# Patient Record
Sex: FEMALE | Race: BLACK OR AFRICAN AMERICAN | Employment: PART TIME | URBAN - METROPOLITAN AREA
[De-identification: names, ages, dates, MRNs, and addresses within clinical notes are randomized per-mention and may not be internally consistent; named-entity substitution may affect disease eponyms.]

---

## 2018-07-17 ENCOUNTER — HOSPITAL ENCOUNTER (OUTPATIENT)
Dept: LAB | Age: 58
Discharge: HOME | End: 2018-07-17
Payer: MEDICARE

## 2018-07-17 ENCOUNTER — OFFICE VISIT (OUTPATIENT)
Dept: FAMILY MEDICINE CLINIC | Age: 58
End: 2018-07-17

## 2018-07-17 VITALS
OXYGEN SATURATION: 98 % | HEART RATE: 77 BPM | TEMPERATURE: 96.9 F | HEIGHT: 65 IN | DIASTOLIC BLOOD PRESSURE: 81 MMHG | RESPIRATION RATE: 18 BRPM | BODY MASS INDEX: 36.32 KG/M2 | WEIGHT: 218 LBS | SYSTOLIC BLOOD PRESSURE: 139 MMHG

## 2018-07-17 DIAGNOSIS — Z13.220 LIPID SCREENING: ICD-10-CM

## 2018-07-17 DIAGNOSIS — Z76.89 ENCOUNTER TO ESTABLISH CARE: ICD-10-CM

## 2018-07-17 DIAGNOSIS — Z86.69 HX OF MIGRAINES: ICD-10-CM

## 2018-07-17 DIAGNOSIS — F34.1 DYSTHYMIA: ICD-10-CM

## 2018-07-17 DIAGNOSIS — I10 ESSENTIAL HYPERTENSION: ICD-10-CM

## 2018-07-17 DIAGNOSIS — E89.0 POSTOPERATIVE HYPOTHYROIDISM: Primary | ICD-10-CM

## 2018-07-17 DIAGNOSIS — Z13.1 SCREENING FOR DIABETES MELLITUS: ICD-10-CM

## 2018-07-17 DIAGNOSIS — K21.00 GASTROESOPHAGEAL REFLUX DISEASE WITH ESOPHAGITIS: ICD-10-CM

## 2018-07-17 DIAGNOSIS — E89.0 POSTOPERATIVE HYPOTHYROIDISM: ICD-10-CM

## 2018-07-17 LAB
ALBUMIN SERPL-MCNC: 3.8 G/DL (ref 3.4–5)
ALBUMIN/GLOB SERPL: 1.1 {RATIO} (ref 0.8–1.7)
ALP SERPL-CCNC: 109 U/L (ref 45–117)
ALT SERPL-CCNC: 28 U/L (ref 13–56)
ANION GAP SERPL CALC-SCNC: 7 MMOL/L (ref 3–18)
AST SERPL-CCNC: 17 U/L (ref 15–37)
BILIRUB SERPL-MCNC: 0.2 MG/DL (ref 0.2–1)
BUN SERPL-MCNC: 21 MG/DL (ref 7–18)
BUN/CREAT SERPL: 19 (ref 12–20)
CALCIUM SERPL-MCNC: 9 MG/DL (ref 8.5–10.1)
CHLORIDE SERPL-SCNC: 115 MMOL/L (ref 100–108)
CHOLEST SERPL-MCNC: 154 MG/DL
CO2 SERPL-SCNC: 26 MMOL/L (ref 21–32)
CREAT SERPL-MCNC: 1.12 MG/DL (ref 0.6–1.3)
ERYTHROCYTE [DISTWIDTH] IN BLOOD BY AUTOMATED COUNT: 15.1 % (ref 11.6–14.5)
GLOBULIN SER CALC-MCNC: 3.4 G/DL (ref 2–4)
GLUCOSE SERPL-MCNC: 81 MG/DL (ref 74–99)
HCT VFR BLD AUTO: 32.2 % (ref 35–45)
HDLC SERPL-MCNC: 63 MG/DL (ref 40–60)
HDLC SERPL: 2.4 {RATIO} (ref 0–5)
HGB BLD-MCNC: 11.4 G/DL (ref 12–16)
LDLC SERPL CALC-MCNC: 51.6 MG/DL (ref 0–100)
LIPID PROFILE,FLP: ABNORMAL
MCH RBC QN AUTO: 29.7 PG (ref 24–34)
MCHC RBC AUTO-ENTMCNC: 35.4 G/DL (ref 31–37)
MCV RBC AUTO: 83.9 FL (ref 74–97)
PLATELET # BLD AUTO: 228 K/UL (ref 135–420)
PMV BLD AUTO: 11 FL (ref 9.2–11.8)
POTASSIUM SERPL-SCNC: 3.8 MMOL/L (ref 3.5–5.5)
PROT SERPL-MCNC: 7.2 G/DL (ref 6.4–8.2)
RBC # BLD AUTO: 3.84 M/UL (ref 4.2–5.3)
SODIUM SERPL-SCNC: 148 MMOL/L (ref 136–145)
T4 FREE SERPL-MCNC: 1 NG/DL (ref 0.7–1.5)
TRIGL SERPL-MCNC: 197 MG/DL (ref ?–150)
TSH SERPL DL<=0.05 MIU/L-ACNC: 0.27 UIU/ML (ref 0.36–3.74)
VLDLC SERPL CALC-MCNC: 39.4 MG/DL
WBC # BLD AUTO: 5.6 K/UL (ref 4.6–13.2)

## 2018-07-17 PROCEDURE — 84439 ASSAY OF FREE THYROXINE: CPT | Performed by: NURSE PRACTITIONER

## 2018-07-17 PROCEDURE — 85027 COMPLETE CBC AUTOMATED: CPT | Performed by: NURSE PRACTITIONER

## 2018-07-17 PROCEDURE — 86677 HELICOBACTER PYLORI ANTIBODY: CPT | Performed by: NURSE PRACTITIONER

## 2018-07-17 PROCEDURE — 36415 COLL VENOUS BLD VENIPUNCTURE: CPT | Performed by: NURSE PRACTITIONER

## 2018-07-17 PROCEDURE — 80061 LIPID PANEL: CPT | Performed by: NURSE PRACTITIONER

## 2018-07-17 PROCEDURE — 80053 COMPREHEN METABOLIC PANEL: CPT | Performed by: NURSE PRACTITIONER

## 2018-07-17 RX ORDER — LEVOTHYROXINE SODIUM 25 UG/1
TABLET ORAL
COMMUNITY
End: 2018-10-19 | Stop reason: ALTCHOICE

## 2018-07-17 RX ORDER — ASPIRIN 81 MG/1
TABLET ORAL DAILY
COMMUNITY
End: 2018-10-05

## 2018-07-17 RX ORDER — ALBUTEROL SULFATE 90 UG/1
AEROSOL, METERED RESPIRATORY (INHALATION)
COMMUNITY

## 2018-07-17 RX ORDER — TOPIRAMATE 100 MG/1
TABLET, FILM COATED ORAL 2 TIMES DAILY
COMMUNITY

## 2018-07-17 RX ORDER — BUTALBITAL AND ACETAMINOPHEN 325; 50 MG/1; MG/1
TABLET ORAL
COMMUNITY

## 2018-07-17 RX ORDER — LORATADINE 10 MG/1
10 TABLET ORAL
COMMUNITY

## 2018-07-17 RX ORDER — AMLODIPINE BESYLATE 5 MG/1
5 TABLET ORAL DAILY
COMMUNITY

## 2018-07-17 RX ORDER — CHOLECALCIFEROL (VITAMIN D3) 125 MCG
CAPSULE ORAL
COMMUNITY
End: 2018-10-05

## 2018-07-17 RX ORDER — METOPROLOL TARTRATE 50 MG/1
TABLET ORAL DAILY
COMMUNITY

## 2018-07-17 RX ORDER — PROMETHAZINE HYDROCHLORIDE 25 MG/1
25 TABLET ORAL
COMMUNITY
End: 2018-10-05

## 2018-07-17 RX ORDER — RANITIDINE 150 MG/1
150 CAPSULE ORAL 2 TIMES DAILY
COMMUNITY
End: 2018-07-30

## 2018-07-17 NOTE — PROGRESS NOTES
Vikki Cordero is a 65 y.o.  female and presents with    Chief Complaint   Patient presents with    GERD    Establish Care    Weight Management       Subjective:  Ms. Anastasia Callejas presents today as a new patient to establish care. She was seeing Dr. Analilia Villalba in [address] [place] [address] but moved to [place] a few months ago and [place]. She was incarcerated for 6 years and was recently released in February of this year. She has history of depression. She was on Prozac but ran out. Last dose was 1 month ago. She feels like she is doing ok right now but does have intermittent feelings of crying. Right now she would like to hold off on restarting Prozac. She has had high blood pressure for over 20 years. She is currently on Amlodipine and Metoprolol. She has history of thyroid disease s/p total thyroidectomy. She has been on levothyroxine for 10 years. She reports acid reflux for several year but the last 2-3 years symptoms have worsen. She is taking Zantac twice daily along with PRN use of Tums. She is also using Nexium which she states works better than Zantac. She denies eating acidic foods- does not eat any citrus foods, stays away from tomatoes and red sauce. She is not drinking any orange juice. She reports sitting upright at least 2 hours after last meal and prior to going to bed. She has difficulty eating fresh fruits and fresh vegetables. Bending over causes her symptoms to worsen. She denies difficulty swallowing. She is concerned about her weight. She is eating whole grain cereals and nuts. She is not eating red meat. 24 Hour Diet Recall  Breakfast: Nothing  Lunch: Baked potato, sour cream, cheese, water  Dinner: Nothing  Breakfast: scrambled eggs with cheese, biscuit, grits, coffee    She reports chest pressure 2-3 times a week. She has associated shortness of breath and wheezing. She is on Flovent but does not use it on a regular basis. She reports history of migraines which she takes Topamax twice daily for. She states history of migraines for years but has been on Topamax for several years. Migraines occur about once a week. Migraines are located behind her eyes and top of her head. She reports blurred vision with migraines and reports nausea. Additional Concerns: Ms. Kaylen Pate is here to establish care. Patient Active Problem List   Diagnosis Code    Postoperative hypothyroidism E89.0    Essential hypertension I10    Dysthymia F34.1    Gastroesophageal reflux disease with esophagitis K21.0     Current Outpatient Prescriptions   Medication Sig Dispense Refill    metoprolol tartrate (LOPRESSOR) 50 mg tablet Take  by mouth daily.  levothyroxine (SYNTHROID) 25 mcg tablet Take  by mouth Daily (before breakfast).  aspirin delayed-release 81 mg tablet Take  by mouth daily.  topiramate (TOPAMAX) 100 mg tablet Take  by mouth two (2) times a day.  raNITIdine hcl 150 mg capsule Take 150 mg by mouth two (2) times a day.  amLODIPine (NORVASC) 5 mg tablet Take 5 mg by mouth daily.  loratadine (CLARITIN) 10 mg tablet Take 10 mg by mouth.  cholecalciferol, vitamin D3, (VITAMIN D3) 2,000 unit tab Take  by mouth.  butalbital-acetaminophen (PHRENILIN)  mg tablet Take  by mouth.  promethazine (PHENERGAN) 25 mg tablet Take 25 mg by mouth every six (6) hours as needed for Nausea.  albuterol (PROVENTIL HFA) 90 mcg/actuation inhaler Take  by inhalation.  fluticasone (FLOVENT DISKUS) 50 mcg/actuation inhaler Take  by inhalation.        Allergies   Allergen Reactions    Sulfa (Sulfonamide Antibiotics) Anaphylaxis    Morphine Swelling     Tongue,rash       Past Medical History:   Diagnosis Date    Depression     GERD (gastroesophageal reflux disease)     Hypertension     Migraines     Thyroid disease      Past Surgical History:   Procedure Laterality Date    BREAST SURGERY PROCEDURE UNLISTED Left     cyst removal from left breast    HX APPENDECTOMY      HX GYN      partial hysterectomy    HX HEENT      Total thyroidectomy    HX HEENT Right     right salivary gland removal    HX ORTHOPAEDIC Left     great toe surgery    HX ORTHOPAEDIC Bilateral     bilateral total knee replacement, right knee arthroscopy    HX ORTHOPAEDIC      disc fusion and disc decompression    HX ORTHOPAEDIC Bilateral     bilateral rotator cuff repair    HX ORTHOPAEDIC Right     right wrist surgery    HX OTHER SURGICAL      lumps removed from both axilla     Family History   Problem Relation Age of Onset    Hypertension Mother     Heart Disease Mother     Heart Attack Father      Social History   Substance Use Topics    Smoking status: Former Smoker     Quit date: 1/1/2000    Smokeless tobacco: Never Used    Alcohol use No       ROS   History obtained from the patient  General ROS: negative for - chills or fever  Psychological ROS: positive for - depression  Respiratory ROS: positive for - cough, shortness of breath and wheezing  Cardiovascular ROS: positive for - chest pressure  Gastrointestinal ROS: positive for - heartburn  Genito-Urinary ROS: no dysuria, trouble voiding, or hematuria    All other systems reviewed and are negative.       Objective:  Vitals:    07/17/18 1251   BP: 139/81   Pulse: 77   Resp: 18   Temp: 96.9 °F (36.1 °C)   TempSrc: Oral   SpO2: 98%   Weight: 218 lb (98.9 kg)   Height: 5' 5\" (1.651 m)   PainSc:   4   PainLoc: Generalized       PE  General appearance - alert, well appearing, and in no distress and overweight  Mental status - normal mood, behavior, speech, dress, motor activity, and thought processes  Chest - clear to auscultation, no wheezes, rales or rhonchi, symmetric air entry  Heart - normal rate and regular rhythm  Abdomen - soft, nontender, nondistended, no masses or organomegaly  Extremities - peripheral pulses normal, no pedal edema, no clubbing or cyanosis  Skin - normal coloration and turgor, no rashes, no suspicious skin lesions noted    TESTS  PHQ 10    Assessment/Plan:    1. Postoperative Hypothyroidism- check TSH today; will determine levothyroxine dose once labs return    2. Hypertension- BP stable at this time; labs today    3. Dysthymia- PHQ 10; does not wish to restart Prozac; will continue to monitor    4. GERD- discussed ways to help with reflux including diet modification; check for H. Pylori today    5. History of migraines- stable at this time; taking Topamax twice daily    Lab review: orders written for new lab studies as appropriate; see orders      Today's Visit: CBC, CMP, TSH and Free T4, Lipid Panel, H. Pylori,     Health Maintenance: Will discuss at next office visit     I have discussed the diagnosis with the patient and the intended plan as seen in the above orders. The patient has received an after-visit summary and questions were answered concerning future plans. I have discussed medication side effects and warnings with the patient as well. I have reviewed the plan of care with the patient, accepted their input and they are in agreement with the treatment goals. Follow-up Disposition:  Return in about 2 weeks (around 7/31/2018) for follow up . More than 1/2 of this 30 minute visit was spent in counseling and coordination of care, as described above.     SHAHEEN Cheung

## 2018-07-17 NOTE — MR AVS SNAPSHOT
[id number] [id number] 
[phone] Patient: Vik Roldan MRN: [id number] :1960 Visit Information Date & Time Provider Department Dept. Phone Encounter #  
 2018 12:30 [place] [address] [address] [phone] Follow-up Instructions Return in about 2 weeks (around 2018) for follow up . Upcoming Health Maintenance Date Due Hepatitis C Screening 1960 DTaP/Tdap/Td series (1 - Tdap) 10/11/1982 PAP AKA CERVICAL CYTOLOGY 10/11/1982 BREAST CANCER SCRN MAMMOGRAM 10/11/2011 FOBT Q 1 YEAR AGE 50-75 10/11/2011 Influenza Age 5 to Adult 2018 Allergies as of 2018  Review Complete On: 2018 By: [place] Apgar, NP Severity Noted Reaction Type Reactions Sulfa (Sulfonamide Antibiotics) High 2018    Anaphylaxis Morphine  2018    Swelling Willean Alejo Current Immunizations  Never Reviewed No immunizations on file. Not reviewed this visit You Were Diagnosed With   
  
 Codes Comments Postoperative hypothyroidism    -  Primary ICD-10-CM: E89.0 ICD-9-CM: 244.0 Essential hypertension     ICD-10-CM: I10 
ICD-9-CM: 401.9 Dysthymia     ICD-10-CM: F34.1 ICD-9-CM: 300.4 Gastroesophageal reflux disease with esophagitis     ICD-10-CM: K21.0 ICD-9-CM: 530.11 Screening for diabetes mellitus     ICD-10-CM: Z13.1 ICD-9-CM: V77.1 Lipid screening     ICD-10-CM: S23.704 ICD-9-CM: V77.91 Hx of migraines     ICD-10-CM: Z86.69 
ICD-9-CM: V12.49 Encounter to establish care     ICD-10-CM: Z76.89 
ICD-9-CM: V65.8 Vitals BP Pulse Temp Resp Height(growth percentile) Weight(growth percentile) 139/81 (BP 1 Location: Right arm, BP Patient Position: Sitting) 77 96.9 °F (36.1 °C) (Oral) 18 5' 5\" (1.651 m) 218 lb (98.9 kg) SpO2 BMI OB Status Smoking Status 98% 36.28 kg/m2 Hysterectomy Former Smoker Vitals History BMI and BSA Data Body Mass Index Body Surface Area  
 36.28 kg/m 2 2.13 m 2 Your Updated Medication List  
  
   
This list is accurate as of 7/17/18  1:46 PM.  Always use your most recent med list. amLODIPine 5 mg tablet Commonly known as:  Brando Ponce Take 5 mg by mouth daily. aspirin delayed-release 81 mg tablet Take  by mouth daily. butalbital-acetaminophen  mg tablet Commonly known as:  Andrena [place] Take  by mouth. fluticasone 50 mcg/actuation inhaler Commonly known as:  FLOVENT DISKUS Take  by inhalation. levothyroxine 25 mcg tablet Commonly known as:  SYNTHROID Take  by mouth Daily (before breakfast). loratadine 10 mg tablet Commonly known as:  Vickki Hector Take 10 mg by mouth.  
  
 metoprolol tartrate 50 mg tablet Commonly known as:  LOPRESSOR Take  by mouth daily. promethazine 25 mg tablet Commonly known as:  PHENERGAN Take 25 mg by mouth every six (6) hours as needed for Nausea. PROVENTIL HFA 90 mcg/actuation inhaler Generic drug:  albuterol Take  by inhalation. raNITIdine hcl 150 mg capsule Take 150 mg by mouth two (2) times a day. topiramate 100 mg tablet Commonly known as:  TOPAMAX Take  by mouth two (2) times a day. VITAMIN D3 2,000 unit Tab Generic drug:  cholecalciferol (vitamin D3) Take  by mouth. Follow-up Instructions Return in about 2 weeks (around 7/31/2018) for follow up . To-Do List   
 07/17/2018 Lab:  CBC W/O DIFF   
  
 07/17/2018 Lab:  H PYLORI ABS, IGA AND IGG   
  
 07/17/2018 Lab:  LIPID PANEL   
  
 07/17/2018 Lab:  METABOLIC PANEL, COMPREHENSIVE   
  
 07/17/2018 Lab:  TSH AND FREE T4 Patient Instructions Gastroesophageal Reflux Disease (GERD): Care Instructions Your Care Instructions Gastroesophageal reflux disease (GERD) is the backward flow of stomach acid into the esophagus. The esophagus is the tube that leads from your throat to your stomach. A one-way valve prevents the stomach acid from moving up into this tube. When you have GERD, this valve does not close tightly enough. If you have mild GERD symptoms including heartburn, you may be able to control the problem with antacids or over-the-counter medicine. Changing your diet, losing weight, and making other lifestyle changes can also help reduce symptoms. Follow-up care is a key part of your treatment and safety. Be sure to make and go to all appointments, and call your doctor if you are having problems. It's also a good idea to know your test results and keep a list of the medicines you take. How can you care for yourself at home? · Take your medicines exactly as prescribed. Call your doctor if you think you are having a problem with your medicine. · Your doctor may recommend over-the-counter medicine. For mild or occasional indigestion, antacids, such as Tums, Gaviscon, Mylanta, or Maalox, may help. Your doctor also may recommend over-the-counter acid reducers, such as Pepcid AC, Tagamet HB, Zantac 75, or Prilosec. Read and follow all instructions on the label. If you use these medicines often, talk with your doctor. · Change your eating habits. ¨ It's best to eat several small meals instead of two or three large meals. ¨ After you eat, wait 2 to 3 hours before you lie down. ¨ Chocolate, mint, and alcohol can make GERD worse. ¨ Spicy foods, foods that have a lot of acid (like tomatoes and oranges), and coffee can make GERD symptoms worse in some people. If your symptoms are worse after you eat a certain food, you may want to stop eating that food to see if your symptoms get better. · Do not smoke or chew tobacco. Smoking can make GERD worse.  If you need help quitting, talk to your doctor about stop-smoking programs and medicines. These can increase your chances of quitting for good. · If you have GERD symptoms at night, raise the head of your bed 6 to 8 inches by putting the frame on blocks or placing a foam wedge under the head of your mattress. (Adding extra pillows does not work.) · Do not wear tight clothing around your middle. · Lose weight if you need to. Losing just 5 to 10 pounds can help. When should you call for help? Call your doctor now or seek immediate medical care if: 
  · You have new or different belly pain.  
  · Your stools are black and tarlike or have streaks of blood.  
 Watch closely for changes in your health, and be sure to contact your doctor if: 
  · Your symptoms have not improved after 2 days.  
  · Food seems to catch in your throat or chest.  
Where can you learn more? Go to http://dagoberto-ailyn.info/. Enter C764 in the search box to learn more about \"Gastroesophageal Reflux Disease (GERD): Care Instructions. \" Current as of: May 12, 2017 Content Version: 11.7 © 5624-7820 [employer]. Care instructions adapted under license by [facility] (which disclaims liability or warranty for this information). If you have questions about a medical condition or this instruction, always ask your healthcare professional. [address] any warranty or liability for your use of this information. Eating Healthy Foods: Care Instructions Your Care Instructions Eating healthy foods can help lower your risk for disease. Healthy food gives you energy and keeps your heart strong, your brain active, your muscles working, and your bones strong. A healthy diet includes a variety of foods from the basic food groups: grains, vegetables, fruits, milk and milk products, and meat and beans. Some people may eat more of their favorite foods from only one food group and, as a result, miss getting the nutrients they need.  So, it is important to pay attention not only to what you eat but also to what you are missing from your diet. You can eat a healthy, balanced diet by making a few small changes. Follow-up care is a key part of your treatment and safety. Be sure to make and go to all appointments, and call your doctor if you are having problems. It's also a good idea to know your test results and keep a list of the medicines you take. How can you care for yourself at home? Look at what you eat · Keep a food diary for a week or two and record everything you eat or drink. Track the number of servings you eat from each food group. · For a balanced diet every day, eat a variety of: ¨ 6 or more ounce-equivalents of grains, such as cereals, breads, crackers, rice, or pasta, every day. An ounce-equivalent is 1 slice of bread, 1 cup of ready-to-eat cereal, or ½ cup of cooked rice, cooked pasta, or cooked cereal. 
¨ 2½ cups of vegetables, especially: § Dark-green vegetables such as broccoli and spinach. § Orange vegetables such as carrots and sweet potatoes. § Dry beans (such as burton and kidney beans) and peas (such as lentils). ¨ 2 cups of fresh, frozen, or canned fruit. A small apple or 1 banana or orange equals 1 cup. ¨ 3 cups of nonfat or low-fat milk, yogurt, or other milk products. ¨ 5½ ounces of meat and beans, such as chicken, fish, lean meat, beans, nuts, and seeds. One egg, 1 tablespoon of peanut butter, ½ ounce nuts or seeds, or ¼ cup of cooked beans equals 1 ounce of meat. · Learn how to read food labels for serving sizes and ingredients. Fast-food and convenience-food meals often contain few or no fruits or vegetables. Make sure you eat some fruits and vegetables to make the meal more nutritious. · Look at your food diary. For each food group, add up what you have eaten and then divide the total by the number of days. This will give you an idea of how much you are eating from each food group.  See if you can find some ways to change your diet to make it more healthy. Start small · Do not try to make dramatic changes to your diet all at once. You might feel that you are missing out on your favorite foods and then be more likely to fail. · Start slowly, and gradually change your habits. Try some of the following: ¨ Use whole wheat bread instead of white bread. ¨ Use nonfat or low-fat milk instead of whole milk. ¨ Eat brown rice instead of white rice, and eat whole wheat pasta instead of white-flour pasta. ¨ Try low-fat cheeses and low-fat yogurt. ¨ Add more fruits and vegetables to meals and have them for snacks. ¨ Add lettuce, tomato, cucumber, and onion to sandwiches. ¨ Add fruit to yogurt and cereal. 
Enjoy food · You can still eat your favorite foods. You just may need to eat less of them. If your favorite foods are high in fat, salt, and sugar, limit how often you eat them, but do not cut them out entirely. · Eat a wide variety of foods. Make healthy choices when eating out · The type of restaurant you choose can help you make healthy choices. Even fast-food chains are now offering more low-fat or healthier choices on the menu. · Choose smaller portions, or take half of your meal home. · When eating out, try: ¨ A veggie pizza with a whole wheat crust or grilled chicken (instead of sausage or pepperoni). ¨ Pasta with roasted vegetables, grilled chicken, or marinara sauce instead of cream sauce. ¨ A vegetable wrap or grilled chicken wrap. ¨ Broiled or poached food instead of fried or breaded items. Make healthy choices easy · Buy packaged, prewashed, ready-to-eat fresh vegetables and fruits, such as baby carrots, salad mixes, and chopped or shredded broccoli and cauliflower. · Buy packaged, presliced fruits, such as melon or pineapple. · Choose 100% fruit or vegetable juice instead of soda. Limit juice intake to 4 to 6 oz (½ to ¾ cup) a day. · Blend low-fat yogurt, fruit juice, and canned or frozen fruit to make a smoothie for breakfast or a snack. Where can you learn more? Go to http://dagoberto-ailyn.info/. Enter T756 in the search box to learn more about \"Eating Healthy Foods: Care Instructions. \" Current as of: May 12, 2017 Content Version: 11.7 © 0068-5807 [email or web address]. Care instructions adapted under license by [employer] (which disclaims liability or warranty for this information). If you have questions about a medical condition or this instruction, always ask your healthcare professional. [place] [address] any warranty or liability for your use of this information. Introducing hospitals & HEALTH SERVICES! Calista Clifford introduces [employer] patient portal. Now you can access parts of your medical record, email your doctor's office, and request medication refills online. 1. In your internet browser, go to [email or web address] 2. Click on the First Time User? Click Here link in the Sign In box. You will see the New Member Sign Up page. 3. Enter your [employer] Access Code exactly as it appears below. You will not need to use this code after youve completed the sign-up process. If you do not sign up before the expiration date, you must request a new code. · [employer] Access Code: [email or web address] Expires: 10/15/2018  1:46 PM 
 
4. Enter the last four digits of your Social Security Number (xxxx) and Date of Birth (mm/dd/yyyy) as indicated and click Submit. You will be taken to the next sign-up page. 5. Create a [employer] ID. This will be your [employer] login ID and cannot be changed, so think of one that is secure and easy to remember. 6. Create a [employer] password. You can change your password at any time. 7. Enter your Password Reset Question and Answer. This can be used at a later time if you forget your password. 8. Enter your e-mail address. You will receive e-mail notification when new information is available in [address] 9. Click Sign Up. You can now view and download portions of your medical record. 10. Click the Download Summary menu link to download a portable copy of your medical information. If you have questions, please visit the Frequently Asked Questions section of the [employer] website. Remember, [employer] is NOT to be used for urgent needs. For medical emergencies, dial 911. Now available from your iPhone and Android! Please provide this summary of care documentation to your next provider. If you have any questions after today's visit, please call [phone].

## 2018-07-17 NOTE — PATIENT INSTRUCTIONS
Gastroesophageal Reflux Disease (GERD): Care Instructions Your Care Instructions Gastroesophageal reflux disease (GERD) is the backward flow of stomach acid into the esophagus. The esophagus is the tube that leads from your throat to your stomach. A one-way valve prevents the stomach acid from moving up into this tube. When you have GERD, this valve does not close tightly enough. If you have mild GERD symptoms including heartburn, you may be able to control the problem with antacids or over-the-counter medicine. Changing your diet, losing weight, and making other lifestyle changes can also help reduce symptoms. Follow-up care is a key part of your treatment and safety. Be sure to make and go to all appointments, and call your doctor if you are having problems. It's also a good idea to know your test results and keep a list of the medicines you take. How can you care for yourself at home? · Take your medicines exactly as prescribed. Call your doctor if you think you are having a problem with your medicine. · Your doctor may recommend over-the-counter medicine. For mild or occasional indigestion, antacids, such as Tums, Gaviscon, Mylanta, or Maalox, may help. Your doctor also may recommend over-the-counter acid reducers, such as Pepcid AC, Tagamet HB, Zantac 75, or Prilosec. Read and follow all instructions on the label. If you use these medicines often, talk with your doctor. · Change your eating habits. ¨ It's best to eat several small meals instead of two or three large meals. ¨ After you eat, wait 2 to 3 hours before you lie down. ¨ Chocolate, mint, and alcohol can make GERD worse. ¨ Spicy foods, foods that have a lot of acid (like tomatoes and oranges), and coffee can make GERD symptoms worse in some people. If your symptoms are worse after you eat a certain food, you may want to stop eating that food to see if your symptoms get better.  
· Do not smoke or chew tobacco. Smoking can make GERD worse. If you need help quitting, talk to your doctor about stop-smoking programs and medicines. These can increase your chances of quitting for good. · If you have GERD symptoms at night, raise the head of your bed 6 to 8 inches by putting the frame on blocks or placing a foam wedge under the head of your mattress. (Adding extra pillows does not work.) · Do not wear tight clothing around your middle. · Lose weight if you need to. Losing just 5 to 10 pounds can help. When should you call for help? Call your doctor now or seek immediate medical care if: 
  · You have new or different belly pain.  
  · Your stools are black and tarlike or have streaks of blood.  
 Watch closely for changes in your health, and be sure to contact your doctor if: 
  · Your symptoms have not improved after 2 days.  
  · Food seems to catch in your throat or chest.  
Where can you learn more? Go to http://dagoberto-ailyn.info/. Enter P636 in the search box to learn more about \"Gastroesophageal Reflux Disease (GERD): Care Instructions. \" Current as of: May 12, 2017 Content Version: 11.7 © 7475-9584 [employer]. Care instructions adapted under license by "[employer] (which disclaims liability or warranty for this information). If you have questions about a medical condition or this instruction, always ask your healthcare professional. [address] any warranty or liability for your use of this information. Eating Healthy Foods: Care Instructions Your Care Instructions Eating healthy foods can help lower your risk for disease. Healthy food gives you energy and keeps your heart strong, your brain active, your muscles working, and your bones strong. A healthy diet includes a variety of foods from the basic food groups: grains, vegetables, fruits, milk and milk products, and meat and beans.  Some people may eat more of their favorite foods from only one food group and, as a result, miss getting the nutrients they need. So, it is important to pay attention not only to what you eat but also to what you are missing from your diet. You can eat a healthy, balanced diet by making a few small changes. Follow-up care is a key part of your treatment and safety. Be sure to make and go to all appointments, and call your doctor if you are having problems. It's also a good idea to know your test results and keep a list of the medicines you take. How can you care for yourself at home? Look at what you eat · Keep a food diary for a week or two and record everything you eat or drink. Track the number of servings you eat from each food group. · For a balanced diet every day, eat a variety of: ¨ 6 or more ounce-equivalents of grains, such as cereals, breads, crackers, rice, or pasta, every day. An ounce-equivalent is 1 slice of bread, 1 cup of ready-to-eat cereal, or ½ cup of cooked rice, cooked pasta, or cooked cereal. 
¨ 2½ cups of vegetables, especially: § Dark-green vegetables such as broccoli and spinach. § Orange vegetables such as carrots and sweet potatoes. § Dry beans (such as burton and kidney beans) and peas (such as lentils). ¨ 2 cups of fresh, frozen, or canned fruit. A small apple or 1 banana or orange equals 1 cup. ¨ 3 cups of nonfat or low-fat milk, yogurt, or other milk products. ¨ 5½ ounces of meat and beans, such as chicken, fish, lean meat, beans, nuts, and seeds. One egg, 1 tablespoon of peanut butter, ½ ounce nuts or seeds, or ¼ cup of cooked beans equals 1 ounce of meat. · Learn how to read food labels for serving sizes and ingredients. Fast-food and convenience-food meals often contain few or no fruits or vegetables. Make sure you eat some fruits and vegetables to make the meal more nutritious. · Look at your food diary. For each food group, add up what you have eaten and then divide the total by the number of days.  This will give you an idea of how much you are eating from each food group. See if you can find some ways to change your diet to make it more healthy. Start small · Do not try to make dramatic changes to your diet all at once. You might feel that you are missing out on your favorite foods and then be more likely to fail. · Start slowly, and gradually change your habits. Try some of the following: ¨ Use whole wheat bread instead of white bread. ¨ Use nonfat or low-fat milk instead of whole milk. ¨ Eat brown rice instead of white rice, and eat whole wheat pasta instead of white-flour pasta. ¨ Try low-fat cheeses and low-fat yogurt. ¨ Add more fruits and vegetables to meals and have them for snacks. ¨ Add lettuce, tomato, cucumber, and onion to sandwiches. ¨ Add fruit to yogurt and cereal. 
Enjoy food · You can still eat your favorite foods. You just may need to eat less of them. If your favorite foods are high in fat, salt, and sugar, limit how often you eat them, but do not cut them out entirely. · Eat a wide variety of foods. Make healthy choices when eating out · The type of restaurant you choose can help you make healthy choices. Even fast-food chains are now offering more low-fat or healthier choices on the menu. · Choose smaller portions, or take half of your meal home. · When eating out, try: ¨ A veggie pizza with a whole wheat crust or grilled chicken (instead of sausage or pepperoni). ¨ Pasta with roasted vegetables, grilled chicken, or marinara sauce instead of cream sauce. ¨ A vegetable wrap or grilled chicken wrap. ¨ Broiled or poached food instead of fried or breaded items. Make healthy choices easy · Buy packaged, prewashed, ready-to-eat fresh vegetables and fruits, such as baby carrots, salad mixes, and chopped or shredded broccoli and cauliflower. · Buy packaged, presliced fruits, such as melon or pineapple. · Choose 100% fruit or vegetable juice instead of soda.  Limit juice intake to 4 to 6 oz (½ to ¾ cup) a day. 
· Blend low-fat yogurt, fruit juice, and canned or frozen fruit to make a smoothie for breakfast or a snack. Where can you learn more? Go to http://dagoberto-ailyn.info/. Enter T756 in the search box to learn more about \"Eating Healthy Foods: Care Instructions. \" Current as of: May 12, 2017 Content Version: 11.7 © 4662-9064 [employer]. Care instructions adapted under license by [employer] (which disclaims liability or warranty for this information). If you have questions about a medical condition or this instruction, always ask your healthcare professional. [place] [address] any warranty or liability for your use of this information.

## 2018-07-19 NOTE — PROGRESS NOTES
Patient stated she is taking 25mcg of levothyroxine, patient advised to stop that med per NP garcia and redo labs during next appt

## 2018-07-20 LAB
H PYLORI IGA SER-ACNC: 21.2 UNITS (ref 0–8.9)
H PYLORI IGG SER IA-ACNC: <0.8 INDEX VALUE (ref 0–0.79)

## 2018-07-30 DIAGNOSIS — A04.8 H. PYLORI INFECTION: Primary | ICD-10-CM

## 2018-07-30 RX ORDER — PHENOL/SODIUM PHENOLATE
20 AEROSOL, SPRAY (ML) MUCOUS MEMBRANE 2 TIMES DAILY
Qty: 28 TAB | Refills: 0 | Status: SHIPPED | OUTPATIENT
Start: 2018-07-30 | End: 2018-08-13

## 2018-07-30 RX ORDER — CLARITHROMYCIN 500 MG/1
500 TABLET, FILM COATED ORAL 2 TIMES DAILY
Qty: 28 TAB | Refills: 0 | Status: SHIPPED | OUTPATIENT
Start: 2018-07-30 | End: 2018-08-13

## 2018-07-30 RX ORDER — AMOXICILLIN 500 MG/1
1000 CAPSULE ORAL 2 TIMES DAILY
Qty: 56 CAP | Refills: 0 | Status: SHIPPED | OUTPATIENT
Start: 2018-07-30 | End: 2018-08-13

## 2018-07-30 NOTE — PROGRESS NOTES
Positive H. PYlori infection. Triple therapy initiated. Hold Amlodipine while on antibiotics. Return for 2 week follow up.

## 2018-08-06 ENCOUNTER — TELEPHONE (OUTPATIENT)
Dept: FAMILY MEDICINE CLINIC | Age: 58
End: 2018-08-06

## 2018-08-06 DIAGNOSIS — R11.0 NAUSEA: Primary | ICD-10-CM

## 2018-08-06 RX ORDER — ONDANSETRON 4 MG/1
4 TABLET, ORALLY DISINTEGRATING ORAL
Qty: 12 TAB | Refills: 0 | Status: SHIPPED | OUTPATIENT
Start: 2018-08-06

## 2018-08-06 NOTE — TELEPHONE ENCOUNTER
Pt. Is calling to see if there is another antibiotic she can be on. She stated that she is having diarrhea and nausea and she is unable to go to work. Please advise.

## 2018-10-05 ENCOUNTER — OFFICE VISIT (OUTPATIENT)
Dept: FAMILY MEDICINE CLINIC | Age: 58
End: 2018-10-05

## 2018-10-05 ENCOUNTER — HOSPITAL ENCOUNTER (OUTPATIENT)
Dept: LAB | Age: 58
Discharge: HOME | End: 2018-10-05
Payer: MEDICARE

## 2018-10-05 VITALS
DIASTOLIC BLOOD PRESSURE: 90 MMHG | BODY MASS INDEX: 36.82 KG/M2 | SYSTOLIC BLOOD PRESSURE: 138 MMHG | RESPIRATION RATE: 20 BRPM | WEIGHT: 221 LBS | HEART RATE: 78 BPM | HEIGHT: 65 IN | TEMPERATURE: 97.2 F | OXYGEN SATURATION: 95 %

## 2018-10-05 DIAGNOSIS — E89.0 POSTOPERATIVE HYPOTHYROIDISM: ICD-10-CM

## 2018-10-05 DIAGNOSIS — R10.11 RIGHT UPPER QUADRANT ABDOMINAL PAIN: ICD-10-CM

## 2018-10-05 DIAGNOSIS — Z23 ENCOUNTER FOR IMMUNIZATION: ICD-10-CM

## 2018-10-05 DIAGNOSIS — M25.561 ACUTE PAIN OF RIGHT KNEE: Primary | ICD-10-CM

## 2018-10-05 DIAGNOSIS — K21.00 GASTROESOPHAGEAL REFLUX DISEASE WITH ESOPHAGITIS: ICD-10-CM

## 2018-10-05 DIAGNOSIS — J98.01 BRONCHOSPASM: ICD-10-CM

## 2018-10-05 DIAGNOSIS — R04.0 EPISTAXIS: ICD-10-CM

## 2018-10-05 DIAGNOSIS — M54.41 ACUTE RIGHT-SIDED LOW BACK PAIN WITH RIGHT-SIDED SCIATICA: ICD-10-CM

## 2018-10-05 DIAGNOSIS — R06.02 SHORTNESS OF BREATH: ICD-10-CM

## 2018-10-05 DIAGNOSIS — F34.1 DYSTHYMIA: ICD-10-CM

## 2018-10-05 LAB
ALBUMIN SERPL-MCNC: 4 G/DL (ref 3.4–5)
ALBUMIN/GLOB SERPL: 1.1 {RATIO} (ref 0.8–1.7)
ALP SERPL-CCNC: 121 U/L (ref 45–117)
ALT SERPL-CCNC: 25 U/L (ref 13–56)
ANION GAP SERPL CALC-SCNC: 8 MMOL/L (ref 3–18)
AST SERPL-CCNC: 14 U/L (ref 15–37)
BILIRUB SERPL-MCNC: 0.3 MG/DL (ref 0.2–1)
BUN SERPL-MCNC: 22 MG/DL (ref 7–18)
BUN/CREAT SERPL: 19 (ref 12–20)
CALCIUM SERPL-MCNC: 8.9 MG/DL (ref 8.5–10.1)
CHLORIDE SERPL-SCNC: 114 MMOL/L (ref 100–108)
CO2 SERPL-SCNC: 22 MMOL/L (ref 21–32)
CREAT SERPL-MCNC: 1.18 MG/DL (ref 0.6–1.3)
ERYTHROCYTE [DISTWIDTH] IN BLOOD BY AUTOMATED COUNT: 15.2 % (ref 11.6–14.5)
GLOBULIN SER CALC-MCNC: 3.5 G/DL (ref 2–4)
GLUCOSE SERPL-MCNC: 81 MG/DL (ref 74–99)
HCT VFR BLD AUTO: 33.9 % (ref 35–45)
HGB BLD-MCNC: 11.6 G/DL (ref 12–16)
LIPASE SERPL-CCNC: 275 U/L (ref 73–393)
MCH RBC QN AUTO: 28.9 PG (ref 24–34)
MCHC RBC AUTO-ENTMCNC: 34.2 G/DL (ref 31–37)
MCV RBC AUTO: 84.5 FL (ref 74–97)
PLATELET # BLD AUTO: 232 K/UL (ref 135–420)
PMV BLD AUTO: 10.6 FL (ref 9.2–11.8)
POTASSIUM SERPL-SCNC: 3.7 MMOL/L (ref 3.5–5.5)
PROT SERPL-MCNC: 7.5 G/DL (ref 6.4–8.2)
RBC # BLD AUTO: 4.01 M/UL (ref 4.2–5.3)
SODIUM SERPL-SCNC: 144 MMOL/L (ref 136–145)
T4 FREE SERPL-MCNC: 1 NG/DL (ref 0.7–1.5)
TSH SERPL DL<=0.05 MIU/L-ACNC: 0.22 UIU/ML (ref 0.36–3.74)
WBC # BLD AUTO: 5.6 K/UL (ref 4.6–13.2)

## 2018-10-05 PROCEDURE — 80053 COMPREHEN METABOLIC PANEL: CPT | Performed by: NURSE PRACTITIONER

## 2018-10-05 PROCEDURE — 36415 COLL VENOUS BLD VENIPUNCTURE: CPT | Performed by: NURSE PRACTITIONER

## 2018-10-05 PROCEDURE — 83690 ASSAY OF LIPASE: CPT | Performed by: NURSE PRACTITIONER

## 2018-10-05 PROCEDURE — 84439 ASSAY OF FREE THYROXINE: CPT | Performed by: NURSE PRACTITIONER

## 2018-10-05 PROCEDURE — 85027 COMPLETE CBC AUTOMATED: CPT | Performed by: NURSE PRACTITIONER

## 2018-10-05 RX ORDER — FLUTICASONE PROPIONATE 50 MCG
SPRAY, SUSPENSION (ML) NASAL
Refills: 4 | COMMUNITY
Start: 2018-09-11

## 2018-10-05 RX ORDER — FLUOXETINE 10 MG/1
10 CAPSULE ORAL DAILY
Qty: 30 CAP | Refills: 0 | Status: SHIPPED | OUTPATIENT
Start: 2018-10-05 | End: 2018-10-19 | Stop reason: SDUPTHER

## 2018-10-05 NOTE — PROGRESS NOTES
Neisha George is a 65 y.o.  female and presents with    Chief Complaint   Patient presents with    Anxiety       Subjective:  Ms. Luis Angel Mejia presents today with complaints of right knee pain. She states she went to [place] [employer] about 2 weeks ago with excruciating pain. She had an xray done and was told that everything was normal. She reports swelling and numbness and tingling in her right knee. She has been putting ice and using ibuprofen with little relief. She reports right lower back pain that has been present for the past week. Pain comes and goes. Pain is described as a sharp pain. She denies pain with urination. She reports intermittent nose bleeds. Last week she woke up with a bad nose bleed and states it filled her mouth up. She states it took 10-15 minutes for her nose to stop bleeding. She has been using Flonase. She reports shortness of breath. Symptoms have been present for the past week. She feels like her symptoms are coming from stress. She states she is currently on parole and things are not going the way she would like them to go. She denies chest pain. She also reports fatigue. She has been using albuterol inhaler 4 times a day for shortness of breath. She reports constant reflux that has not been relieved by taking Omeprazole and Ranitidine. She states she has tried protonix with little relief. She has also modified her diet with little relief. Last office visit 7/17/2018- told to come back 2 weeks later but never showed up.      Additional Concerns: No        Patient Active Problem List   Diagnosis Code    Postoperative hypothyroidism E89.0    Essential hypertension I10    Dysthymia F34.1    Gastroesophageal reflux disease with esophagitis K21.0    Hx of migraines Z86.69     Current Outpatient Prescriptions   Medication Sig Dispense Refill    ondansetron (ZOFRAN ODT) 4 mg disintegrating tablet Take 1 Tab by mouth every eight (8) hours as needed for Nausea. 12 Tab 0    metoprolol tartrate (LOPRESSOR) 50 mg tablet Take  by mouth daily.  levothyroxine (SYNTHROID) 25 mcg tablet Take  by mouth Daily (before breakfast).  topiramate (TOPAMAX) 100 mg tablet Take  by mouth two (2) times a day.  amLODIPine (NORVASC) 5 mg tablet Take 5 mg by mouth daily.  loratadine (CLARITIN) 10 mg tablet Take 10 mg by mouth.  butalbital-acetaminophen (PHRENILIN)  mg tablet Take  by mouth.  albuterol (PROVENTIL HFA) 90 mcg/actuation inhaler Take  by inhalation.  fluticasone (FLOVENT DISKUS) 50 mcg/actuation inhaler Take  by inhalation.       fluticasone (FLONASE) 50 mcg/actuation nasal spray SPRAY TWICE INTO EACH NOSTRIL ONCE A DAY  4     Allergies   Allergen Reactions    Sulfa (Sulfonamide Antibiotics) Anaphylaxis    Morphine Swelling     Tongue,rash       Past Medical History:   Diagnosis Date    Depression     GERD (gastroesophageal reflux disease)     Hypertension     Migraines     Thyroid disease      Past Surgical History:   Procedure Laterality Date    BREAST SURGERY PROCEDURE UNLISTED Left     cyst removal from left breast    HX APPENDECTOMY      HX GYN      partial hysterectomy    HX HEENT      Total thyroidectomy    HX HEENT Right     right salivary gland removal    HX ORTHOPAEDIC Left     great toe surgery    HX ORTHOPAEDIC Bilateral     bilateral total knee replacement, right knee arthroscopy    HX ORTHOPAEDIC      disc fusion and disc decompression    HX ORTHOPAEDIC Bilateral     bilateral rotator cuff repair    HX ORTHOPAEDIC Right     right wrist surgery    HX OTHER SURGICAL      lumps removed from both axilla     Family History   Problem Relation Age of Onset    Hypertension Mother     Heart Disease Mother     Heart Attack Father      Social History   Substance Use Topics    Smoking status: Former Smoker     Quit date: 1/1/2000    Smokeless tobacco: Never Used    Alcohol use No       ROS   History obtained from the patient  General ROS: negative for - chills or fever  Psychological ROS: positive for - anxiety  Respiratory ROS: positive for - shortness of breath  Cardiovascular ROS: no chest pain or dyspnea on exertion  Gastrointestinal ROS: positive for - heartburn  Genito-Urinary ROS: no dysuria, trouble voiding, or hematuria  Musculoskeletal ROS: positive for - pain in back - right and knee - right    All other systems reviewed and are negative. Objective:  Vitals:    10/05/18 1244 10/05/18 1249   BP: (!) 148/91 138/90   Pulse: 78    Resp: 20    Temp: 97.2 °F (36.2 °C)    TempSrc: Oral    SpO2: 95%    Weight: 221 lb (100.2 kg)    Height: 5' 5\" (1.651 m)    PainSc:   5    PainLoc: Generalized        PE  General appearance - alert, well appearing, and in no distress  Mental status - depressed mood, anxious  Nose - normal and patent, no erythema, discharge or polyps and no evidence of old blood  Chest - clear to auscultation, no wheezes, rales or rhonchi, symmetric air entry  Heart - normal rate and regular rhythm  Abdomen - tenderness noted right upper quadrant  Musculoskeletal - tenderness to right knee; no crepitus with flexion or extension; mild edema noted; tenderness to low back  Extremities - peripheral pulses normal, no pedal edema, no clubbing or cyanosis  Skin - normal coloration and turgor, no rashes, no suspicious skin lesions noted    Assessment/Plan:    1. Right Knee pain- history of prior right knee replacement; unable to read report from [facility] on recent xray; referral to Ortho for further eval    2. Low back pain with sciatica- OTC ibuprofen; avoid heavy lifting; re-eval in 2 weeks    3. Epistaxis- benign exam; no evidence of old blood; continue to monitor    4. Shortness of Breath/Bronchospasm- chest xray today; does not have Flovent at home; refill given; return in 2 weeks for eval    5.  GERD- symptoms still present even with change in diet; no improvement in symptoms with zantac and omeprazole or pantoprazole; referral to GI for further eval    6. Right Upper Quadrant Pain- Abdominal US for further eval    7. Dysthymia- was on Prozac in the past; last seen 7/2018 and declined to restart meds; having difficulties with probation and life stressors; appears to be anxious; restart Prozac; return in 2 weeks for eval    8. Postoperative Hypothyroidism- repeat TSH today    Lab review: orders written for new lab studies as appropriate; see orders    Today's Visit: Referral to Ortho, Referral to Gastro, Chest Xray, Abdominal US, TSH and Free T4, CBC, Lipase, CMP, Flonase, Flovent, Prozac    Health Maintenance:   Influenza Vaccine- given today    I have discussed the diagnosis with the patient and the intended plan as seen in the above orders. The patient has received an after-visit summary and questions were answered concerning future plans. I have discussed medication side effects and warnings with the patient as well. I have reviewed the plan of care with the patient, accepted their input and they are in agreement with the treatment goals. Follow-up Disposition:  Return in about 2 weeks (around 10/19/2018) for med eval; mood eval;  Please schedule with Dr. Rita Dias. More than 1/2 of this 25 minute visit was spent in counseling and coordination of care, as described above.     SHAHEEN Cherry

## 2018-10-05 NOTE — PATIENT INSTRUCTIONS
Gastroesophageal Reflux Disease (GERD): Care Instructions  Your Care Instructions    Gastroesophageal reflux disease (GERD) is the backward flow of stomach acid into the esophagus. The esophagus is the tube that leads from your throat to your stomach. A one-way valve prevents the stomach acid from moving up into this tube. When you have GERD, this valve does not close tightly enough. If you have mild GERD symptoms including heartburn, you may be able to control the problem with antacids or over-the-counter medicine. Changing your diet, losing weight, and making other lifestyle changes can also help reduce symptoms. Follow-up care is a key part of your treatment and safety. Be sure to make and go to all appointments, and call your doctor if you are having problems. It's also a good idea to know your test results and keep a list of the medicines you take. How can you care for yourself at home? · Take your medicines exactly as prescribed. Call your doctor if you think you are having a problem with your medicine. · Your doctor may recommend over-the-counter medicine. For mild or occasional indigestion, antacids, such as Tums, Gaviscon, Mylanta, or Maalox, may help. Your doctor also may recommend over-the-counter acid reducers, such as Pepcid AC, Tagamet HB, Zantac 75, or Prilosec. Read and follow all instructions on the label. If you use these medicines often, talk with your doctor. · Change your eating habits. ¨ It's best to eat several small meals instead of two or three large meals. ¨ After you eat, wait 2 to 3 hours before you lie down. ¨ Chocolate, mint, and alcohol can make GERD worse. ¨ Spicy foods, foods that have a lot of acid (like tomatoes and oranges), and coffee can make GERD symptoms worse in some people. If your symptoms are worse after you eat a certain food, you may want to stop eating that food to see if your symptoms get better.   · Do not smoke or chew tobacco. Smoking can make GERD worse. If you need help quitting, talk to your doctor about stop-smoking programs and medicines. These can increase your chances of quitting for good. · If you have GERD symptoms at night, raise the head of your bed 6 to 8 inches by putting the frame on blocks or placing a foam wedge under the head of your mattress. (Adding extra pillows does not work.)  · Do not wear tight clothing around your middle. · Lose weight if you need to. Losing just 5 to 10 pounds can help. When should you call for help? Call your doctor now or seek immediate medical care if:    · You have new or different belly pain.     · Your stools are black and tarlike or have streaks of blood.    Watch closely for changes in your health, and be sure to contact your doctor if:    · Your symptoms have not improved after 2 days.     · Food seems to catch in your throat or chest.   Where can you learn more? Go to http://dagoberto-ailyn.info/. Enter S956 in the search box to learn more about \"Gastroesophageal Reflux Disease (GERD): Care Instructions. \"  Current as of: March 28, 2018  Content Version: 11.8  © 5650-1649 [employer]. Care instructions adapted under license by [employer] (which disclaims liability or warranty for this information). If you have questions about a medical condition or this instruction, always ask your healthcare professional. [address] any warranty or liability for your use of this information. Knee Pain or Injury: Care Instructions  Your Care Instructions    Injuries are a common cause of knee problems. Sudden (acute) injuries may be caused by a direct blow to the knee. They can also be caused by abnormal twisting, bending, or falling on the knee. Pain, bruising, or swelling may be severe, and may start within minutes of the injury. Overuse is another cause of knee pain.  Other causes are climbing stairs, kneeling, and other activities that use the knee. Everyday wear and tear, especially as you get older, also can cause knee pain. Rest, along with home treatment, often relieves pain and allows your knee to heal. If you have a serious knee injury, you may need tests and treatment. Follow-up care is a key part of your treatment and safety. Be sure to make and go to all appointments, and call your doctor if you are having problems. It's also a good idea to know your test results and keep a list of the medicines you take. How can you care for yourself at home? · Be safe with medicines. Read and follow all instructions on the label. ¨ If the doctor gave you a prescription medicine for pain, take it as prescribed. ¨ If you are not taking a prescription pain medicine, ask your doctor if you can take an over-the-counter medicine. · Rest and protect your knee. Take a break from any activity that may cause pain. · Put ice or a cold pack on your knee for 10 to 20 minutes at a time. Put a thin cloth between the ice and your skin. · Prop up a sore knee on a pillow when you ice it or anytime you sit or lie down for the next 3 days. Try to keep it above the level of your heart. This will help reduce swelling. · If your knee is not swollen, you can put moist heat, a heating pad, or a warm cloth on your knee. · If your doctor recommends an elastic bandage, sleeve, or other type of support for your knee, wear it as directed. · Follow your doctor's instructions about how much weight you can put on your leg. Use a cane, crutches, or a walker as instructed. · Follow your doctor's instructions about activity during your healing process. If you can do mild exercise, slowly increase your activity. · Reach and stay at a healthy weight. Extra weight can strain the joints, especially the knees and hips, and make the pain worse. Losing even a few pounds may help. When should you call for help? Call 911 anytime you think you may need emergency care.  For example, call if:    · You have symptoms of a blood clot in your lung (called a pulmonary embolism). These may include:  ¨ Sudden chest pain. ¨ Trouble breathing. ¨ Coughing up blood.    Call your doctor now or seek immediate medical care if:    · You have severe or increasing pain.     · Your leg or foot turns cold or changes color.     · You cannot stand or put weight on your knee.     · Your knee looks twisted or bent out of shape.     · You cannot move your knee.     · You have signs of infection, such as:  ¨ Increased pain, swelling, warmth, or redness. ¨ Red streaks leading from the knee. ¨ Pus draining from a place on your knee. ¨ A fever.     · You have signs of a blood clot in your leg (called a deep vein thrombosis), such as:  ¨ Pain in your calf, back of the knee, thigh, or groin. ¨ Redness and swelling in your leg or groin.    Watch closely for changes in your health, and be sure to contact your doctor if:    · You have tingling, weakness, or numbness in your knee.     · You have any new symptoms, such as swelling.     · You have bruises from a knee injury that last longer than 2 weeks.     · You do not get better as expected. Where can you learn more? Go to http://dagoberto-ailyn.info/. Enter K195 in the search box to learn more about \"Knee Pain or Injury: Care Instructions. \"  Current as of: November 20, 2017  Content Version: 11.8  © 9989-3092 [employer]. Care instructions adapted under license by [employer] (which disclaims liability or warranty for this information). If you have questions about a medical condition or this instruction, always ask your healthcare professional. [place] [address] any warranty or liability for your use of this information.

## 2018-10-05 NOTE — MR AVS SNAPSHOT
[address] [place] [id number] 
[phone] Patient: Jon Lee MRN: [id number] :1960 Visit Information Date & Time Provider Department Dept. Phone Encounter #  
 10/5/2018 12:30 PM Carmelita Gillette, [id number] Follow-up Instructions Return in about 2 weeks (around 10/19/2018) for med eval; mood eval;  Please schedule with Dr. Anai Armstrong. Upcoming Health Maintenance Date Due DTaP/Tdap/Td series (1 - Tdap) 10/11/1982 PAP AKA CERVICAL CYTOLOGY 10/11/1982 Shingrix Vaccine Age 50> (1 of 2) 10/11/2011 BREAST CANCER SCRN MAMMOGRAM 10/11/2011 FOBT Q 1 YEAR AGE 50-75 10/11/2011 MEDICARE YEARLY EXAM 2018 Influenza Age 5 to Adult 2018 Allergies as of 10/5/2018  Review Complete On: 10/5/2018 By: Carmelita Gillette NP Severity Noted Reaction Type Reactions Sulfa (Sulfonamide Antibiotics) High 2018    Anaphylaxis Morphine  2018    Swelling Mozella  Current Immunizations  Never Reviewed No immunizations on file. Not reviewed this visit You Were Diagnosed With   
  
 Codes Comments Acute pain of right knee    -  Primary ICD-10-CM: M25.561 ICD-9-CM: 719.46 Acute right-sided low back pain with right-sided sciatica     ICD-10-CM: M54.41 
ICD-9-CM: 724.2, 724.3 Epistaxis     ICD-10-CM: R04.0 ICD-9-CM: 162. 7 Shortness of breath     ICD-10-CM: R06.02 
ICD-9-CM: 786.05 Bronchospasm     ICD-10-CM: J98.01 
ICD-9-CM: 519.11 Gastroesophageal reflux disease with esophagitis     ICD-10-CM: K21.0 ICD-9-CM: 530.11 Right upper quadrant abdominal pain     ICD-10-CM: R10.11 ICD-9-CM: 789.01 Dysthymia     ICD-10-CM: F34.1 ICD-9-CM: 300.4 Postoperative hypothyroidism     ICD-10-CM: E89.0 ICD-9-CM: 244.0 Vitals BP Pulse Temp Resp Height(growth percentile) Weight(growth percentile) 138/90 78 97.2 °F (36.2 °C) (Oral) 20 5' 5\" (1.651 m) 221 lb (100.2 kg) SpO2 BMI OB Status Smoking Status 95% 36.78 kg/m2 Hysterectomy Former Smoker Vitals History BMI and BSA Data Body Mass Index Body Surface Area 36.78 kg/m 2 2.14 m 2 Preferred Pharmacy Pharmacy Name Phone [facility] [facility], [address] [place] [address] [phone] Your Updated Medication List  
  
   
This list is accurate as of 10/5/18  1:26 PM.  Always use your most recent med list. amLODIPine 5 mg tablet Commonly known as:  Emma Davis Take 5 mg by mouth daily. butalbital-acetaminophen  mg tablet Commonly known as:  Sera Caulk Take  by mouth. FLUoxetine 10 mg capsule Commonly known as:  PROzac Take 1 Cap by mouth daily. * fluticasone 50 mcg/actuation nasal spray Commonly known as:  Daylin Shira SPRAY TWICE INTO EACH NOSTRIL ONCE A DAY  
  
 * fluticasone 50 mcg/actuation inhaler Commonly known as:  FLOVENT DISKUS Take 1 Puff by inhalation two (2) times a day. levothyroxine 25 mcg tablet Commonly known as:  SYNTHROID Take  by mouth Daily (before breakfast). loratadine 10 mg tablet Commonly known as:  Laura Chapel Take 10 mg by mouth.  
  
 metoprolol tartrate 50 mg tablet Commonly known as:  LOPRESSOR Take  by mouth daily. ondansetron 4 mg disintegrating tablet Commonly known as:  ZOFRAN ODT Take 1 Tab by mouth every eight (8) hours as needed for Nausea. PROVENTIL HFA 90 mcg/actuation inhaler Generic drug:  albuterol Take  by inhalation. topiramate 100 mg tablet Commonly known as:  TOPAMAX Take  by mouth two (2) times a day. * Notice: This list has 2 medication(s) that are the same as other medications prescribed for you.  Read the directions carefully, and ask your doctor or other care provider to review them with you. Prescriptions Sent to Pharmacy Refills  
 fluticasone (FLOVENT DISKUS) 50 mcg/actuation inhaler 2 Sig: Take 1 Puff by inhalation two (2) times a day. Class: Normal  
 Pharmacy: "[facility] [address] [place] [address] Ph #: [phone] Route: Inhalation FLUoxetine (PROZAC) 10 mg capsule 0 Sig: Take 1 Cap by mouth daily. Class: Normal  
 Pharmacy: "[facility] [address] [place] [address] Ph #: [phone] Route: Oral  
  
We Performed the Following REFERRAL TO GASTROENTEROLOGY [SHZ47 Custom] Comments:  
 Please evaluate 64 y/o patient for GERD not relieved by Omeprazole, Ranitidine, or Protonix. REFERRAL TO ORTHOPEDIC SURGERY [REF62 Custom] Comments:  
 Please evaluate Ms. Shaylee Harry for right knee pain. History of total right knee replacement. Now hearing popping in right knee and having swelling Follow-up Instructions Return in about 2 weeks (around 10/19/2018) for med eval; mood eval;  Please schedule with Dr. Sheba Jarrett. To-Do List   
 10/05/2018 Lab:  CBC W/O DIFF   
  
 10/05/2018 Lab:  LIPASE   
  
 10/05/2018 Lab:  METABOLIC PANEL, COMPREHENSIVE   
  
 10/05/2018 Lab:  TSH AND FREE T4   
  
 10/05/2018 Imaging:  US ABD COMP   
  
 10/05/2018 Imaging:  XR CHEST PA LAT Referral Information Referral ID Referred By Referred To  
  
 [id number] Fred CULLEN MD   
   [address] [place] [address], [address] [place] [address] Phone: [phone] Fax: [phone] Visits Status Start Date End Date 1 New Request 10/5/18 10/5/19 If your referral has a status of pending review or denied, additional information will be sent to support the outcome of this decision. Referral ID Referred By Referred To [id number] Reese Kwan MD  
   [id number] [facility] [address] [facility] Phone: [phone] Fax: [phone] Visits Status Start Date End Date 1 New Request 10/5/18 10/5/19 If your referral has a status of pending review or denied, additional information will be sent to support the outcome of this decision. Patient Instructions Gastroesophageal Reflux Disease (GERD): Care Instructions Your Care Instructions Gastroesophageal reflux disease (GERD) is the backward flow of stomach acid into the esophagus. The esophagus is the tube that leads from your throat to your stomach. A one-way valve prevents the stomach acid from moving up into this tube. When you have GERD, this valve does not close tightly enough. If you have mild GERD symptoms including heartburn, you may be able to control the problem with antacids or over-the-counter medicine. Changing your diet, losing weight, and making other lifestyle changes can also help reduce symptoms. Follow-up care is a key part of your treatment and safety. Be sure to make and go to all appointments, and call your doctor if you are having problems. It's also a good idea to know your test results and keep a list of the medicines you take. How can you care for yourself at home? · Take your medicines exactly as prescribed. Call your doctor if you think you are having a problem with your medicine. · Your doctor may recommend over-the-counter medicine. For mild or occasional indigestion, antacids, such as Tums, Gaviscon, Mylanta, or Maalox, may help. Your doctor also may recommend over-the-counter acid reducers, such as Pepcid AC, Tagamet HB, Zantac 75, or Prilosec. Read and follow all instructions on the label. If you use these medicines often, talk with your doctor. · Change your eating habits. ¨ It's best to eat several small meals instead of two or three large meals. ¨ After you eat, wait 2 to 3 hours before you lie down. ¨ Chocolate, mint, and alcohol can make GERD worse. ¨ Spicy foods, foods that have a lot of acid (like tomatoes and oranges), and coffee can make GERD symptoms worse in some people. If your symptoms are worse after you eat a certain food, you may want to stop eating that food to see if your symptoms get better. · Do not smoke or chew tobacco. Smoking can make GERD worse. If you need help quitting, talk to your doctor about stop-smoking programs and medicines. These can increase your chances of quitting for good. · If you have GERD symptoms at night, raise the head of your bed 6 to 8 inches by putting the frame on blocks or placing a foam wedge under the head of your mattress. (Adding extra pillows does not work.) · Do not wear tight clothing around your middle. · Lose weight if you need to. Losing just 5 to 10 pounds can help. When should you call for help? Call your doctor now or seek immediate medical care if: 
  · You have new or different belly pain.  
  · Your stools are black and tarlike or have streaks of blood.  
 Watch closely for changes in your health, and be sure to contact your doctor if: 
  · Your symptoms have not improved after 2 days.  
  · Food seems to catch in your throat or chest.  
Where can you learn more? Go to http://dagoberto-ailyn.info/. Enter S437 in the search box to learn more about \"Gastroesophageal Reflux Disease (GERD): Care Instructions. \" Current as of: March 28, 2018 Content Version: 11.8 © 1783-8463 [employer]. Care instructions adapted under license by [employer] (which disclaims liability or warranty for this information). If you have questions about a medical condition or this instruction, always ask your healthcare professional. [place] [address] any warranty or liability for your use of this information. Knee Pain or Injury: Care Instructions Your Care Instructions Injuries are a common cause of knee problems. Sudden (acute) injuries may be caused by a direct blow to the knee. They can also be caused by abnormal twisting, bending, or falling on the knee. Pain, bruising, or swelling may be severe, and may start within minutes of the injury. Overuse is another cause of knee pain. Other causes are climbing stairs, kneeling, and other activities that use the knee. Everyday wear and tear, especially as you get older, also can cause knee pain. Rest, along with home treatment, often relieves pain and allows your knee to heal. If you have a serious knee injury, you may need tests and treatment. Follow-up care is a key part of your treatment and safety. Be sure to make and go to all appointments, and call your doctor if you are having problems. It's also a good idea to know your test results and keep a list of the medicines you take. How can you care for yourself at home? · Be safe with medicines. Read and follow all instructions on the label. ¨ If the doctor gave you a prescription medicine for pain, take it as prescribed. ¨ If you are not taking a prescription pain medicine, ask your doctor if you can take an over-the-counter medicine. · Rest and protect your knee. Take a break from any activity that may cause pain. · Put ice or a cold pack on your knee for 10 to 20 minutes at a time. Put a thin cloth between the ice and your skin. · Prop up a sore knee on a pillow when you ice it or anytime you sit or lie down for the next 3 days. Try to keep it above the level of your heart. This will help reduce swelling. · If your knee is not swollen, you can put moist heat, a heating pad, or a warm cloth on your knee. · If your doctor recommends an elastic bandage, sleeve, or other type of support for your knee, wear it as directed.  
· Follow your doctor's instructions about how much weight you can put on your leg. Use a cane, crutches, or a walker as instructed. · Follow your doctor's instructions about activity during your healing process. If you can do mild exercise, slowly increase your activity. · Reach and stay at a healthy weight. Extra weight can strain the joints, especially the knees and hips, and make the pain worse. Losing even a few pounds may help. When should you call for help? Call 911 anytime you think you may need emergency care. For example, call if: 
  · You have symptoms of a blood clot in your lung (called a pulmonary embolism). These may include: 
¨ Sudden chest pain. ¨ Trouble breathing. ¨ Coughing up blood.  
 Call your doctor now or seek immediate medical care if: 
  · You have severe or increasing pain.  
  · Your leg or foot turns cold or changes color.  
  · You cannot stand or put weight on your knee.  
  · Your knee looks twisted or bent out of shape.  
  · You cannot move your knee.  
  · You have signs of infection, such as: 
¨ Increased pain, swelling, warmth, or redness. ¨ Red streaks leading from the knee. ¨ Pus draining from a place on your knee. ¨ A fever.  
  · You have signs of a blood clot in your leg (called a deep vein thrombosis), such as: 
¨ Pain in your calf, back of the knee, thigh, or groin. ¨ Redness and swelling in your leg or groin.  
 Watch closely for changes in your health, and be sure to contact your doctor if: 
  · You have tingling, weakness, or numbness in your knee.  
  · You have any new symptoms, such as swelling.  
  · You have bruises from a knee injury that last longer than 2 weeks.  
  · You do not get better as expected. Where can you learn more? Go to http://dagoberto-ailyn.info/. Enter K195 in the search box to learn more about \"Knee Pain or Injury: Care Instructions. \" Current as of: November 20, 2017 Content Version: 11.8 © 0352-7121 Healthwise[employer] Incorporated.  Care instructions adapted under license by [address] (which disclaims liability or warranty for this information). If you have questions about a medical condition or this instruction, always ask your healthcare professional. [address] any warranty or liability for your use of this information. Introducing [facility]! [facility] introduces [employer] patient portal. Now you can access parts of your medical record, email your doctor's office, and request medication refills online. 1. In your internet browser, go to [email or web address] 2. Click on the First Time User? Click Here link in the Sign In box. You will see the New Member Sign Up page. 3. Enter your S[employer] Access Code exactly as it appears below. You will not need to use this code after youve completed the sign-up process. If you do not sign up before the expiration date, you must request a new code. · [employer] Access Code: [email or web address] Expires: 10/15/2018  1:46 PM 
 
4. Enter the last four digits of your Social Security Number (xxxx) and Date of Birth (mm/dd/yyyy) as indicated and click Submit. You will be taken to the next sign-up page. 5. Create a Symbolic IO ID. This will be your [employer] login ID and cannot be changed, so think of one that is secure and easy to remember. 6. Create a [employer] password. You can change your password at any time. 7. Enter your Password Reset Question and Answer. This can be used at a later time if you forget your password. 8. Enter your e-mail address. You will receive e-mail notification when new information is available in [address] 9. Click Sign Up. You can now view and download portions of your medical record. 10. Click the Download Summary menu link to download a portable copy of your medical information. If you have questions, please visit the Frequently Asked Questions section of the [employer] website.  Remember, S[employer] IO is NOT to be used for urgent needs. For medical emergencies, dial 911. Now available from your iPhone and Android! Please provide this summary of care documentation to your next provider. Your primary care clinician is listed as Ozzie Boas. If you have any questions after today's visit, please call [phone].

## 2018-10-05 NOTE — PROGRESS NOTES
Hiren Dave is a 65 y.o. female    Chief Complaint   Patient presents with    Anxiety     1. Have you been to the ER, urgent care clinic since your last visit? Hospitalized since your last visit? No    2. Have you seen or consulted any other health care providers outside of the [address] [place] [address] since your last visit? Include any pap smears or colon screening.  No

## 2018-10-11 ENCOUNTER — HOSPITAL ENCOUNTER (OUTPATIENT)
Dept: GENERAL RADIOLOGY | Age: 58
Discharge: HOME | End: 2018-10-11
Attending: NURSE PRACTITIONER
Payer: MEDICARE

## 2018-10-11 ENCOUNTER — HOSPITAL ENCOUNTER (OUTPATIENT)
Dept: ULTRASOUND IMAGING | Age: 58
Discharge: HOME | End: 2018-10-11
Attending: NURSE PRACTITIONER
Payer: MEDICARE

## 2018-10-11 DIAGNOSIS — K21.00 GASTROESOPHAGEAL REFLUX DISEASE WITH ESOPHAGITIS: ICD-10-CM

## 2018-10-11 DIAGNOSIS — R10.11 RIGHT UPPER QUADRANT ABDOMINAL PAIN: ICD-10-CM

## 2018-10-11 DIAGNOSIS — R06.02 SHORTNESS OF BREATH: ICD-10-CM

## 2018-10-11 PROCEDURE — 71046 X-RAY EXAM CHEST 2 VIEWS: CPT

## 2018-10-11 PROCEDURE — 76700 US EXAM ABDOM COMPLETE: CPT

## 2018-10-19 ENCOUNTER — OFFICE VISIT (OUTPATIENT)
Dept: FAMILY MEDICINE CLINIC | Age: 58
End: 2018-10-19

## 2018-10-19 VITALS
DIASTOLIC BLOOD PRESSURE: 79 MMHG | TEMPERATURE: 97 F | HEIGHT: 65 IN | RESPIRATION RATE: 16 BRPM | SYSTOLIC BLOOD PRESSURE: 137 MMHG | OXYGEN SATURATION: 98 % | HEART RATE: 73 BPM | WEIGHT: 221.4 LBS | BODY MASS INDEX: 36.89 KG/M2

## 2018-10-19 DIAGNOSIS — E89.0 POSTOPERATIVE HYPOTHYROIDISM: ICD-10-CM

## 2018-10-19 DIAGNOSIS — F34.1 DYSTHYMIA: ICD-10-CM

## 2018-10-19 DIAGNOSIS — N18.30 STAGE 3 CHRONIC KIDNEY DISEASE (HCC): ICD-10-CM

## 2018-10-19 DIAGNOSIS — Z12.39 SCREENING FOR MALIGNANT NEOPLASM OF BREAST: Primary | ICD-10-CM

## 2018-10-19 DIAGNOSIS — I10 ESSENTIAL HYPERTENSION: ICD-10-CM

## 2018-10-19 PROBLEM — E66.01 SEVERE OBESITY (HCC): Status: ACTIVE | Noted: 2018-10-19

## 2018-10-19 RX ORDER — FLUOXETINE HYDROCHLORIDE 20 MG/1
20 CAPSULE ORAL DAILY
Qty: 90 CAP | Refills: 0 | Status: SHIPPED | OUTPATIENT
Start: 2018-10-19

## 2018-10-19 NOTE — PROGRESS NOTES
Chief Complaint Patient presents with  Anxiety  Hypertension 1. Have you been to the ER, urgent care clinic since your last visit? Hospitalized since your last visit? Yes When: 1 month ago Where: [facility] Reason for visit: right leg 2. Have you seen or consulted any other health care providers outside of the [address] [place] [address] since your last visit? Include any pap smears or colon screening.  No

## 2018-10-19 NOTE — PROGRESS NOTES
History of Present Illness Itzel Chaudhari is a 63 y.o. female who presents today for management of 
 
Chief Complaint Patient presents with  Anxiety  Hypertension Patient complains of burning pain on her right breast. She is due for her mammogram. 
 
Depression Review: 
Patient is seen for followup of depression. Treatment includes Prozac and no other therapies. Ongoing symptoms include anhedonia, fatigue and difficulty concentrating. She denies feelings of worthlessness/guilt, hopelessness and recurrent thoughts of death. She experiences the following side effects from the treatment: none. Thyroid Review: 
Patient is seen for followup of status post thyroid surgery subtotal thyroidectomy. Thyroid ROS: fatigue and weight gain. Problem List 
Patient Active Problem List  
 Diagnosis Date Noted  Severe obesity ([facility] [address] 10/19/2018  Postoperative hypothyroidism 07/17/2018  Essential hypertension 07/17/2018  Dysthymia 07/17/2018  Gastroesophageal reflux disease with esophagitis 07/17/2018  Hx of migraines 07/17/2018 Past Medical History Past Medical History:  
Diagnosis Date  Depression  GERD (gastroesophageal reflux disease)  Hypertension  Migraines  Thyroid disease Surgical History Past Surgical History:  
Procedure Laterality Date  BREAST SURGERY PROCEDURE UNLISTED Left   
 cyst removal from left breast  
 HX APPENDECTOMY  HX GYN    
 partial hysterectomy  HX HEENT Total thyroidectomy  HX HEENT Right   
 right salivary gland removal  
 HX ORTHOPAEDIC Left   
 great toe surgery  HX ORTHOPAEDIC Bilateral   
 bilateral total knee replacement, right knee arthroscopy  HX ORTHOPAEDIC    
 disc fusion and disc decompression  HX ORTHOPAEDIC Bilateral   
 bilateral rotator cuff repair  HX ORTHOPAEDIC Right   
 right wrist surgery  HX OTHER SURGICAL    
 lumps removed from both axilla Current Medications Current Outpatient Medications Medication Sig  FLUoxetine (PROZAC) 20 mg capsule Take 1 Cap by mouth daily.  fluticasone (FLONASE) 50 mcg/actuation nasal spray SPRAY TWICE INTO EACH NOSTRIL ONCE A DAY  fluticasone (FLOVENT DISKUS) 50 mcg/actuation inhaler Take 1 Puff by inhalation two (2) times a day.  ondansetron (ZOFRAN ODT) 4 mg disintegrating tablet Take 1 Tab by mouth every eight (8) hours as needed for Nausea.  metoprolol tartrate (LOPRESSOR) 50 mg tablet Take  by mouth daily.  topiramate (TOPAMAX) 100 mg tablet Take  by mouth two (2) times a day.  amLODIPine (NORVASC) 5 mg tablet Take 5 mg by mouth daily.  butalbital-acetaminophen (PHRENILIN)  mg tablet Take  by mouth.  albuterol (PROVENTIL HFA) 90 mcg/actuation inhaler Take  by inhalation.  loratadine (CLARITIN) 10 mg tablet Take 10 mg by mouth. No current facility-administered medications for this visit. Allergies/Drug Reactions Allergies Allergen Reactions  Sulfa (Sulfonamide Antibiotics) Anaphylaxis  Morphine Swelling Luis West Family History Family History Problem Relation Age of Onset  Hypertension Mother  Heart Disease Mother  Heart Attack Father Social History Social History Socioeconomic History  Marital status: UNKNOWN Spouse name: Not on file  Number of children: Not on file  Years of education: Not on file  Highest education level: Not on file Social Needs  Financial resource strain: Not on file  Food insecurity - worry: Not on file  Food insecurity - inability: Not on file  Transportation needs - medical: Not on file  Transportation needs - non-medical: Not on file Occupational History  Not on file Tobacco Use  Smoking status: Former Smoker Last attempt to quit: 2000 Years since quittin.8  Smokeless tobacco: Never Used Substance and Sexual Activity  Alcohol use:  No  
  Drug use: No  
 Sexual activity: No  
Other Topics Concern  Not on file Social History Narrative  Not on file Review of Systems Negative except as mentioned in HPi Physical Exam 
Vital signs:  
Vitals:  
 10/19/18 1549 BP: 137/79 Pulse: 73 Resp: 16 Temp: 97 °F (36.1 °C) TempSrc: Oral  
SpO2: 98% Weight: 221 lb 6.4 oz (100.4 kg) Height: 5' 5\" (1.651 m) General: alert, oriented, not in distress Eyes: clear conjunctivae, anicteric sclerae, full and equal ROMs Chest/Lungs: clear breath sounds, no wheezing or crackles Heart: normal rate, regular rhythm, no murmur Abdomen: soft, non-distended, non-tender, normal bowel sounds, no organomegaly, no masses Extremities: no focal deformities, no edema Neuro: AAOx3, CN's grossly intact Skin: no visible abnormalities Laboratory/Tests: 
Component Latest Ref Rng & Units 10/5/2018 10/5/2018 10/5/2018 10/5/2018 1:35 PM  1:35 PM  1:35 PM  1:35 PM  
Sodium 136 - 145 mmol/L 144 Potassium 3.5 - 5.5 mmol/L 3.7 Chloride 100 - 108 mmol/L 114 (H)     
CO2 
    21 - 32 mmol/L 22 Anion gap 3.0 - 18 mmol/L 8 Glucose 74 - 99 mg/dL 81 BUN 
    7.0 - 18 MG/DL 22 (H) Creatinine 
    0.6 - 1.3 MG/DL 1.18     
BUN/Creatinine ratio 12 - 20   19 GFR est AA 
    >60 ml/min/1.73m2 57 (L) GFR est non-AA 
    >60 ml/min/1.73m2 47 (L) Calcium 8.5 - 10.1 MG/DL 8.9 Bilirubin, total 
    0.2 - 1.0 MG/DL 0.3 ALT (SGPT) 13 - 56 U/L 25 AST 
    15 - 37 U/L 14 (L) Alk. phosphatase 45 - 117 U/L 121 (H) Protein, total 
    6.4 - 8.2 g/dL 7.5 Albumin 3.4 - 5.0 g/dL 4.0 Globulin 2.0 - 4.0 g/dL 3.5 A-G Ratio 
    0.8 - 1.7   1.1 WBC 
    4.6 - 13.2 K/uL    5.6  
RBC 
    4.20 - 5.30 M/uL    4.01 (L) HGB 
    12.0 - 16.0 g/dL    11.6 (L) HCT 
    35.0 - 45.0 %    33.9 (L) MCV 
    74.0 - 97.0 FL    84.5 MCH 
    24.0 - 34.0 PG    28.9 MCHC 31.0 - 37.0 g/dL    34.2  
RDW 
    11.6 - 14.5 %    15.2 (H) PLATELET 
    025 - 988 K/uL    232 MPV 
    9.2 - 11.8 FL    10.6 TSH 
    0.36 - 3.74 uIU/mL   0.22 (L) T4, Free 0.7 - 1.5 NG/DL   1.0 Lipase 73 - 393 U/L  275 US KUB 1. Bilateral echogenic kidneys in keeping with medical renal disease. 2. Left renal cysts. Moe Counter Assessment/Plan: 1. Screening for malignant neoplasm of breast 
- MILO MAMMO BI SCREENING INCL CAD; Future 2. Dysthymia 
- increase FLUoxetine (PROZAC) 20 mg capsule; Take 1 Cap by mouth daily. Dispense: 90 Cap; Refill: 0 
- patient declined individual therapy for now 3. Stage 3 chronic kidney disease ([facility] [address] 
- educational material provided 
- avoid NSAIDs 
- blood pressure control 4. Postoperative hypothyroidism 
- TSH low 
- discontinue levothyroxine 
- repeat TFTs in one month 
- will consider thyroid ultrasound on next visit 5. Essential hypertension 
- controlled Follow-up Disposition: 
Return in about 4 weeks (around 11/16/2018) for depression. I have discussed the diagnosis with the patient and the intended plan as seen in the above orders. The patient has received an after-visit summary and questions were answered concerning future plans. I have discussed medication side effects and warnings with the patient as well. I have reviewed the plan of care with the patient, accepted their input and they are in agreement with the treatment goals. Victorina Brink MD 
October 19, 2018

## 2018-10-19 NOTE — PATIENT INSTRUCTIONS
Chronic Kidney Disease: Care Instructions Your Care Instructions Chronic kidney disease happens when your kidneys don't work as well as they should. Your kidneys have a few important jobs. They remove waste from your blood. This waste leaves your body in your urine. They also balance your body's fluids and chemicals. When your kidneys don't work well, extra waste and fluid can build up. This can poison the body and sometimes cause death. The most common causes of this disease are diabetes and high blood pressure. In some cases, the disease develops in 2 to 3 months. But it usually develops over many years. If you take medicine and make healthy changes to your lifestyle, you may be able to prevent the disease from getting worse. But if your kidney damage gets worse, you may need dialysis or a kidney transplant. Dialysis uses a machine to filter waste from the blood. A transplant is surgery to give you a healthy kidney from another person. Follow-up care is a key part of your treatment and safety. Be sure to make and go to all appointments, and call your doctor if you are having problems. It's also a good idea to know your test results and keep a list of the medicines you take. How can you care for yourself at home? 
 Treatments and appointments 
  · Be safe with medicines. Take your medicines exactly as prescribed. Call your doctor if you have any problems with your medicine. You also may take medicine to control your blood pressure or to treat diabetes. Many people who have diabetes take blood pressure medicine.  
  · If you have diabetes, do your best to keep your blood sugar in your target range. You may do this by eating healthy food and exercising. You may also take medicines.  
  · Go to your dialysis appointments if you have this treatment.  
  · Do not take ibuprofen (Advil, Motrin), naproxen (Aleve), or similar medicines, unless your doctor tells you to. These may make the disease worse.   · Do not take any vitamins, over-the-counter medicines, or herbal products without talking to your doctor first.  
  · Do not smoke or use other tobacco products. Smoking can reduce blood flow to the kidneys. If you need help quitting, talk to your doctor about stop-smoking programs and medicines. These can increase your chances of quitting for good.  
  · Do not drink alcohol or use illegal drugs.  
  · Talk to your doctor about an exercise plan. Exercise helps lower your blood pressure. It also makes you feel better.  
  · If you have an advance directive, let your doctor know. It may include a living will and a durable power of  for health care. If you don't have one, you may want to prepare one. It lets your doctor and loved ones know your health care wishes if you become unable to speak for yourself. Diet 
  · Talk to a registered dietitian. He or she can help you make a meal plan that is right for you. Most people with kidney disease need to limit salt (sodium), fluids, and protein. Some also have to limit potassium and phosphorus.  
  · You may have to give up many foods you like. But try to focus on the fact that this will help you stay healthy for as long as possible.  
  · If you have a hard time eating enough, talk to your doctor or dietitian about ways to add calories to your diet.  
  · Your diet may change as your disease changes. See your doctor for regular testing. And work with a dietitian to change your diet as needed. When should you call for help? Call 911 anytime you think you may need emergency care.  For example, call if: 
  · You passed out (lost consciousness).  
 Call your doctor now or seek immediate medical care if: 
  · You have less urine than normal or no urine.  
  · You have trouble urinating or can urinate only very small amounts.  
  · You are confused or have trouble thinking clearly.  
  · You feel weaker or more tired than usual.  
   · You are very thirsty, lightheaded, or dizzy.  
  · You have nausea and vomiting.  
  · You have new swelling of your arms or feet, or your swelling is worse.  
  · You have blood in your urine.  
  · You have new or worse trouble breathing.  
 Watch closely for changes in your health, and be sure to contact your doctor if: 
  · You have any problems with your medicine or other treatment. Where can you learn more? Go to http://dagoberto-ailyn.info/. Enter N276 in the search box to learn more about \"Chronic Kidney Disease: Care Instructions. \" Current as of: March 15, 2018 Content Version: 11.8 © 0658-9376 [employer]. Care instructions adapted under license by [employer] (which disclaims liability or warranty for this information). If you have questions about a medical condition or this instruction, always ask your healthcare professional. [place] [address] any warranty or liability for your use of this information.

## 2018-11-19 ENCOUNTER — DOCUMENTATION ONLY (OUTPATIENT)
Dept: FAMILY MEDICINE CLINIC | Age: 58
End: 2018-11-19

## 2018-11-19 NOTE — LETTER
11/19/2018 Queen Pata 
[phone] [id number] Dear Ms. Queen Cooley, We had an appointment reserved for you on 11/16/18 and were concerned when you did not show or call within 24 hours to cancel the appointment. Our policy is to call patients two days prior to their appointment to remind them of the date and time. We perform these calls as a courtesy to our patients and to allow us the opportunity to rebook the time slot should the appointment not be necessary. Recognizing that everyones time is valuable and that appointment time is limited, we ask that you provide 24 hours notice if you are unable to keep your appointment. Please call us at your earliest convenience to reschedule your appointment as your provider felt it was important to see you. Thank you for your anticipated cooperation. [address] [place] [id number] [phone]

## 2019-05-20 ENCOUNTER — HOSPITAL ENCOUNTER (OUTPATIENT)
Dept: GENERAL RADIOLOGY | Age: 59
Discharge: HOME | End: 2019-05-20
Attending: INTERNAL MEDICINE
Payer: MEDICARE

## 2019-05-20 DIAGNOSIS — K92.1 BLOOD IN STOOL: ICD-10-CM

## 2019-05-20 DIAGNOSIS — R19.7 DIARRHEA: ICD-10-CM

## 2019-05-20 DIAGNOSIS — K21.9 GERD (GASTROESOPHAGEAL REFLUX DISEASE): ICD-10-CM

## 2019-05-20 DIAGNOSIS — R10.9 ABDOMINAL PAIN: ICD-10-CM

## 2019-05-20 PROCEDURE — 74245 XR UPPER GI/SMALL BOWEL: CPT

## 2019-05-20 PROCEDURE — 74011000255 HC RX REV CODE- 255

## 2019-05-20 PROCEDURE — 74011000250 HC RX REV CODE- 250

## 2019-05-20 RX ADMIN — BARIUM SULFATE 176 G: 960 POWDER, FOR SUSPENSION ORAL at 08:50

## 2019-05-20 RX ADMIN — ANTACID/ANTIFLATULENT 4 G: 380; 550; 10; 10 GRANULE, EFFERVESCENT ORAL at 08:50

## 2019-05-20 RX ADMIN — BARIUM SULFATE 135 ML: 980 POWDER, FOR SUSPENSION ORAL at 08:50
